# Patient Record
Sex: FEMALE | Race: BLACK OR AFRICAN AMERICAN | Employment: PART TIME | RURAL
[De-identification: names, ages, dates, MRNs, and addresses within clinical notes are randomized per-mention and may not be internally consistent; named-entity substitution may affect disease eponyms.]

---

## 2017-11-21 ENCOUNTER — OFFICE VISIT (OUTPATIENT)
Dept: FAMILY MEDICINE CLINIC | Age: 37
End: 2017-11-21

## 2017-11-21 VITALS
SYSTOLIC BLOOD PRESSURE: 112 MMHG | OXYGEN SATURATION: 98 % | WEIGHT: 114.6 LBS | HEIGHT: 63 IN | RESPIRATION RATE: 17 BRPM | HEART RATE: 82 BPM | DIASTOLIC BLOOD PRESSURE: 80 MMHG | BODY MASS INDEX: 20.3 KG/M2

## 2017-11-21 DIAGNOSIS — J30.9 CHRONIC ALLERGIC RHINITIS, UNSPECIFIED SEASONALITY, UNSPECIFIED TRIGGER: ICD-10-CM

## 2017-11-21 DIAGNOSIS — S16.1XXA STRAIN OF CERVICAL PORTION OF RIGHT TRAPEZIUS MUSCLE: Primary | ICD-10-CM

## 2017-11-21 RX ORDER — NAPROXEN 500 MG/1
500 TABLET ORAL 2 TIMES DAILY WITH MEALS
Qty: 30 TAB | Refills: 5 | Status: SHIPPED | OUTPATIENT
Start: 2017-11-21 | End: 2020-11-10

## 2017-11-21 RX ORDER — CYCLOBENZAPRINE HCL 10 MG
10 TABLET ORAL
Qty: 20 TAB | Refills: 1 | OUTPATIENT
Start: 2017-11-21 | End: 2020-10-19

## 2017-11-21 NOTE — PROGRESS NOTES
Chief Complaint   Patient presents with    [facility]     [facility] follow up from 11-9-17. Right side. HPI:       is a 40 y.o. female. She has a 3year old daughter. Was seen in Urgent Care 11/9/17 and diagnosed with torticollis. New Issues:  Her neck issue started initially in August and got better for a while. Got a massage and then the right side was much worse. Was seen in Urgent care 11/9. She was given prednisone and an anti-inflammatory. She had a chair massage on 11/15 which seemed to make it worse. Denies trauma or injury. No changes in vision. Mild headache. Her company car is also stick shift which seems to make it worse. No Known Allergies    No current outpatient prescriptions on file. No current facility-administered medications for this visit. Past Medical History:   Diagnosis Date    Chronic kidney disease     history of kidney infection 2012       Past Surgical History:   Procedure Laterality Date    HX OTHER SURGICAL      ectopic pregnancy 2013    HX OTHER SURGICAL      d and C       Social History     Social History    Marital status: SINGLE     Spouse name: N/A    Number of children: N/A    Years of education: N/A     Social History Main Topics    Smoking status: Current Every Day Smoker     Packs/day: 0.50    Smokeless tobacco: Never Used    Alcohol use Yes    Drug use: No    Sexual activity: Yes     Partners: Male     Other Topics Concern    None     Social History Narrative       Family History   Problem Relation Age of Onset    Cancer Maternal Grandmother     Diabetes Maternal Grandfather        Above history reviewed. ROS:  Denies fever, chills, cough, chest pain, SOB,  nausea, vomiting, or diarrhea. Denies wt loss, wt gain, hemoptysis, hematochezia or melena.     Physical Examination:    /80 (BP 1 Location: Right arm, BP Patient Position: Sitting)  Pulse 82  Resp 17  Ht 5' 3\" (1.6 m)  Wt 114 lb 9.6 oz (52 kg) SpO2 98%  BMI 20.3 kg/m2    General: Alert and Ox3, Fluent speech  Neck:  Supple, no adenopathy, JVD, mass or bruit  Chest:  Clear to Ausculation, without wheezes, rales, rubs or ronchi  Cardiac: RRR  Extremities:  No cyanosis, clubbing or edema  Neurologic:  Ambulatory without assist, CN 2-12 grossly intact. Moves all extremities. Skin: no rash  Lymphadenopathy: no cervical or supraclavicular nodes    ASSESSMENT AND PLAN:     1. Strain of cervical portion of right trapezius muscle  Start Naproxen  Use Flexeril at night  Start PT  Avoid heavy lifting and twisting  - naproxen (NAPROSYN) 500 mg tablet; Take 1 Tab by mouth two (2) times daily (with meals). Indications: Pain  Dispense: 30 Tab; Refill: 5  - cyclobenzaprine (FLEXERIL) 10 mg tablet; Take 1 Tab by mouth nightly. Indications: Muscle Spasm  Dispense: 20 Tab; Refill: 1  - REFERRAL TO PHYSICAL THERAPY    2. Chronic allergic rhinitis, unspecified seasonality, unspecified trigger  Start daily antihistamine such as Zyrtec, Allegra, Claritin.       RTC PRN    José Miguel Sutherlnad NP

## 2017-11-21 NOTE — MR AVS SNAPSHOT
Visit Information Date & Time Provider Department Dept. Phone Encounter #  
 11/21/2017  9:50 AM [facility] [phone] [id number] Follow-up Instructions Return if symptoms worsen or fail to improve. Follow-up and Disposition History Upcoming Health Maintenance Date Due Pneumococcal 19-64 Medium Risk (1 of 1 - PPSV23) 10/2/2000 PAP AKA CERVICAL CYTOLOGY 10/2/2002 Influenza Age 5 to Adult 8/1/2017 DTaP/Tdap/Td series (2 - Td) 11/6/2024 Allergies as of 11/21/2017  Review Complete On: 11/21/2017 By: Mary Kay Barkley NP No Known Allergies Current Immunizations  Never Reviewed Name Date Influenza Vaccine 10/1/2014 Tdap 11/6/2014  2:02 PM  
  
 Not reviewed this visit You Were Diagnosed With   
  
 Codes Comments Strain of cervical portion of right trapezius muscle    -  Primary ICD-10-CM: S16. Elisabet Muscat ICD-9-CM: 467. 8 Chronic allergic rhinitis, unspecified seasonality, unspecified trigger     ICD-10-CM: J30.9 ICD-9-CM: 477.9 Vitals BP Pulse Resp Height(growth percentile) Weight(growth percentile) SpO2  
 112/80 (BP 1 Location: Right arm, BP Patient Position: Sitting) 82 17 5' 3\" (1.6 m) 114 lb 9.6 oz (52 kg) 98% BMI OB Status Smoking Status 20.3 kg/m2 Recent pregnancy Current Every Day Smoker Vitals History BMI and BSA Data Body Mass Index Body Surface Area  
 20.3 kg/m 2 1.52 m 2 Preferred Pharmacy Pharmacy Name Phone 150 [facility], [address] [phone] Your Updated Medication List  
  
   
This list is accurate as of: 11/21/17 10:42 AM.  Always use your most recent med list.  
  
  
  
  
 cyclobenzaprine 10 mg tablet Commonly known as:  FLEXERIL Take 1 Tab by mouth nightly. Indications: Muscle Spasm  
  
 naproxen 500 mg tablet Commonly known as:  NAPROSYN  
 Take 1 Tab by mouth two (2) times daily (with meals). Indications: Pain Prescriptions Sent to Pharmacy Refills  
 naproxen (NAPROSYN) 500 mg tablet 5 Sig: Take 1 Tab by mouth two (2) times daily (with meals). Indications: Pain Class: Normal  
 Pharmacy: [address] [place] [address] #: [phone] Route: Oral  
 cyclobenzaprine (FLEXERIL) 10 mg tablet 1 Sig: Take 1 Tab by mouth nightly. Indications: Muscle Spasm Class: Normal  
 Pharmacy: [address] [place] [address] #: [phone] Route: Oral  
  
We Performed the Following REFERRAL TO PHYSICAL THERAPY [JSF61 Custom] Comments:  
 [address] location. Cervical strain on left. Follow-up Instructions Return if symptoms worsen or fail to improve. Referral Information Referral ID Referred By Referred To  
  
 [id number] [facility] [place] [address] Phone: [phone] Fax: [phone] Visits Status Start Date End Date 1 New Request 11/21/17 11/21/18 If your referral has a status of pending review or denied, additional information will be sent to support the outcome of this decision. Introducing [facility]! Tika Hart introduces [employer] patient portal. Now you can access parts of your medical record, email your doctor's office, and request medication refills online. 1. In your internet browser, go to [email or web address] 2. Click on the First Time User? Click Here link in the Sign In box. You will see the New Member Sign Up page. 3. Enter your [employer] Access Code exactly as it appears below. You will not need to use this code after youve completed the sign-up process. If you do not sign up before the expiration date, you must request a new code. · [employer] Access Code: [email or web address] Expires: 2/19/2018 10:42 AM 
 4. Enter the last four digits of your Social Security Number (xxxx) and Date of Birth (mm/dd/yyyy) as indicated and click Submit. You will be taken to the next sign-up page. 5. Create a [employer] ID. This will be your [employer] login ID and cannot be changed, so think of one that is secure and easy to remember. 6. Create a [employer] password. You can change your password at any time. 7. Enter your Password Reset Question and Answer. This can be used at a later time if you forget your password. 8. Enter your e-mail address. You will receive e-mail notification when new information is available in [address] 9. Click Sign Up. You can now view and download portions of your medical record. 10. Click the Download Summary menu link to download a portable copy of your medical information. If you have questions, please visit the Frequently Asked Questions section of the [employer] website. Remember, [employer] is NOT to be used for urgent needs. For medical emergencies, dial 911. Now available from your iPhone and Android! Please provide this summary of care documentation to your next provider. Your primary care clinician is listed as Austin Wei. If you have any questions after today's visit, please call [phone].

## 2020-10-16 ENCOUNTER — TELEPHONE (OUTPATIENT)
Dept: PRIMARY CARE CLINIC | Age: 40
End: 2020-10-16

## 2020-10-16 NOTE — TELEPHONE ENCOUNTER
Called patient to request the fax number where she needs the work note sent. After looking for several minutes she was not able to locate the fax number. She requested to call me back. I gave her my direct line and advised her I'm here until 5. I haven't heard back from the patient, I tried to call her back. No answer, unable to leave a vm because mailbox is full.  TESSIE

## 2020-10-21 ENCOUNTER — TELEPHONE (OUTPATIENT)
Dept: PRIMARY CARE CLINIC | Age: 40
End: 2020-10-21

## 2020-10-21 NOTE — TELEPHONE ENCOUNTER
Patient states that  [facility] did a script for Zoloft on 10/06. She said that she never picked it up and the pharmacy says that it needs to be done again for them to fill it.